# Patient Record
Sex: MALE | Race: OTHER
[De-identification: names, ages, dates, MRNs, and addresses within clinical notes are randomized per-mention and may not be internally consistent; named-entity substitution may affect disease eponyms.]

---

## 2022-10-21 ENCOUNTER — HOSPITAL ENCOUNTER (EMERGENCY)
Dept: HOSPITAL 54 - ER | Age: 67
Discharge: NURSING, REHAB OR LONG-TERM CARE | End: 2022-10-21
Payer: MEDICARE

## 2022-10-21 VITALS — SYSTOLIC BLOOD PRESSURE: 107 MMHG | DIASTOLIC BLOOD PRESSURE: 67 MMHG

## 2022-10-21 VITALS — HEIGHT: 71 IN | BODY MASS INDEX: 27.3 KG/M2 | WEIGHT: 195 LBS

## 2022-10-21 DIAGNOSIS — I10: ICD-10-CM

## 2022-10-21 DIAGNOSIS — D49.6: Primary | ICD-10-CM

## 2022-10-21 NOTE — NUR
[facility] - CONFIRMED RESIDENCY 

 [phone]  SPOKE TO [facility]

## 2022-10-21 NOTE — NUR
Patient discharged to [facility] via [facility] in stable condition, 
accompanied by 2 emt. Written and verbal after care instructions given.

## 2022-11-11 ENCOUNTER — HOSPITAL ENCOUNTER (EMERGENCY)
Dept: HOSPITAL 54 - ER | Age: 67
Discharge: HOME | End: 2022-11-11
Payer: MEDICARE

## 2022-11-11 VITALS — DIASTOLIC BLOOD PRESSURE: 91 MMHG | SYSTOLIC BLOOD PRESSURE: 148 MMHG

## 2022-11-11 VITALS — BODY MASS INDEX: 28 KG/M2 | WEIGHT: 200 LBS | HEIGHT: 71 IN

## 2022-11-11 DIAGNOSIS — Z00.00: Primary | ICD-10-CM

## 2022-11-11 DIAGNOSIS — I10: ICD-10-CM

## 2023-03-31 ENCOUNTER — HOSPITAL ENCOUNTER (EMERGENCY)
Dept: HOSPITAL 54 - ER | Age: 68
Discharge: NURSING, REHAB OR LONG-TERM CARE | End: 2023-03-31
Payer: MEDICARE

## 2023-03-31 VITALS — DIASTOLIC BLOOD PRESSURE: 96 MMHG | SYSTOLIC BLOOD PRESSURE: 148 MMHG

## 2023-03-31 VITALS — BODY MASS INDEX: 28.88 KG/M2 | WEIGHT: 184 LBS | HEIGHT: 67 IN

## 2023-03-31 DIAGNOSIS — I10: ICD-10-CM

## 2023-03-31 DIAGNOSIS — K92.0: Primary | ICD-10-CM

## 2023-03-31 DIAGNOSIS — Z79.899: ICD-10-CM

## 2023-03-31 DIAGNOSIS — R10.13: ICD-10-CM

## 2023-03-31 LAB
ALBUMIN SERPL BCP-MCNC: 3.5 G/DL (ref 3.4–5)
ALP SERPL-CCNC: 60 U/L (ref 46–116)
ALT SERPL W P-5'-P-CCNC: 22 U/L (ref 12–78)
AST SERPL W P-5'-P-CCNC: 24 U/L (ref 15–37)
BASOPHILS # BLD AUTO: 0 K/UL (ref 0–0.2)
BASOPHILS NFR BLD AUTO: 0.3 % (ref 0–2)
BILIRUB DIRECT SERPL-MCNC: 0.3 MG/DL (ref 0–0.2)
BILIRUB SERPL-MCNC: 1.6 MG/DL (ref 0.2–1)
BUN SERPL-MCNC: 15 MG/DL (ref 7–18)
CALCIUM SERPL-MCNC: 10.5 MG/DL (ref 8.5–10.1)
CHLORIDE SERPL-SCNC: 101 MMOL/L (ref 98–107)
CO2 SERPL-SCNC: 26 MMOL/L (ref 21–32)
CREAT SERPL-MCNC: 0.8 MG/DL (ref 0.6–1.3)
EOSINOPHIL NFR BLD AUTO: 0 % (ref 0–6)
GLUCOSE SERPL-MCNC: 149 MG/DL (ref 74–106)
HCT VFR BLD AUTO: 37 % (ref 39–51)
HGB BLD-MCNC: 12 G/DL (ref 13.5–17.5)
LIPASE SERPL-CCNC: 106 U/L (ref 73–393)
LYMPHOCYTES NFR BLD AUTO: 0.8 K/UL (ref 0.8–4.8)
LYMPHOCYTES NFR BLD AUTO: 11.7 % (ref 20–44)
MCHC RBC AUTO-ENTMCNC: 32 G/DL (ref 31–36)
MCV RBC AUTO: 74 FL (ref 80–96)
MONOCYTES NFR BLD AUTO: 0.5 K/UL (ref 0.1–1.3)
MONOCYTES NFR BLD AUTO: 7 % (ref 2–12)
NEUTROPHILS # BLD AUTO: 5.5 K/UL (ref 1.8–8.9)
NEUTROPHILS NFR BLD AUTO: 81 % (ref 43–81)
PLATELET # BLD AUTO: 231 K/UL (ref 150–450)
POTASSIUM SERPL-SCNC: 3.7 MMOL/L (ref 3.5–5.1)
PROT SERPL-MCNC: 8.4 G/DL (ref 6.4–8.2)
RBC # BLD AUTO: 5.01 MIL/UL (ref 4.5–6)
SODIUM SERPL-SCNC: 139 MMOL/L (ref 136–145)
WBC NRBC COR # BLD AUTO: 6.8 K/UL (ref 4.3–11)

## 2023-03-31 PROCEDURE — 80076 HEPATIC FUNCTION PANEL: CPT

## 2023-03-31 PROCEDURE — C9113 INJ PANTOPRAZOLE SODIUM, VIA: HCPCS

## 2023-03-31 PROCEDURE — 80048 BASIC METABOLIC PNL TOTAL CA: CPT

## 2023-03-31 PROCEDURE — 83690 ASSAY OF LIPASE: CPT

## 2023-03-31 PROCEDURE — 85025 COMPLETE CBC W/AUTO DIFF WBC: CPT

## 2023-03-31 PROCEDURE — 96375 TX/PRO/DX INJ NEW DRUG ADDON: CPT

## 2023-03-31 PROCEDURE — 96361 HYDRATE IV INFUSION ADD-ON: CPT

## 2023-03-31 PROCEDURE — 71045 X-RAY EXAM CHEST 1 VIEW: CPT

## 2023-03-31 PROCEDURE — 36415 COLL VENOUS BLD VENIPUNCTURE: CPT

## 2023-03-31 PROCEDURE — 74177 CT ABD & PELVIS W/CONTRAST: CPT

## 2023-03-31 PROCEDURE — 96374 THER/PROPH/DIAG INJ IV PUSH: CPT

## 2023-03-31 PROCEDURE — 99285 EMERGENCY DEPT VISIT HI MDM: CPT

## 2023-03-31 NOTE — NUR
CALLED [facility] FOR TRANSPORT, ETA 60-70 MIN, CONFIRMED WITH [facility] AT 
[id number] [place] [address] TO BE EXPECTING PT FOR ARRIVAL.